# Patient Record
(demographics unavailable — no encounter records)

---

## 2025-01-28 NOTE — DISCUSSION/SUMMARY
[FreeTextEntry1] : 25 go  annual exam dw dyspareunia and   treatment options. lubrication, positional  empty  bladder  prior  to sex  clitoral;  orgasm  PAP today  [ ocp sent [facility]

## 2025-01-28 NOTE — HISTORY OF PRESENT ILLNESS
[Hair changes] : hair changes [Irregular Menstrual Interval] : irregular menstrual interval [Staining] : staining [Yes] : Patient has concerns regarding sex [Currently Active] : currently active [Men] : men [Vaginal] : vaginal [Oral] : oral [No] : No [Patient refuses STI testing] : Patient refuses STI testing [FreeTextEntry1] : Morelia Jay 25yo female presents here for an annual. Patient states she have an irregular period she spot or it will skips.   last pap 01/2024 normal   Patient is on birth control Aurovela Fe-120 1mg- 20 mcg (21)/75 MG (7) tablet  once daily   LMP: 01/06/2024  Sexually active with men  [TextBox_4] : 25y   Go  well woman - reports dyspareunia with  penetration   [TextBox_6] : 01/0625  [TextBox_11] : spotting cont ocp [FreeTextEntry3] : ocp

## 2025-01-28 NOTE — HISTORY OF PRESENT ILLNESS
[Hair changes] : hair changes [Irregular Menstrual Interval] : irregular menstrual interval [Staining] : staining [Yes] : Patient has concerns regarding sex [Currently Active] : currently active [Men] : men [Vaginal] : vaginal [Oral] : oral [No] : No [Patient refuses STI testing] : Patient refuses STI testing [FreeTextEntry1] : Morelia Jay 23yo female presents here for an annual. Patient states she have an irregular period she spot or it will skips.   last pap 01/2024 normal   Patient is on birth control Aurovela Fe-120 1mg- 20 mcg (21)/75 MG (7) tablet  once daily   LMP: 01/06/2024  Sexually active with men  [TextBox_4] : 25y   Go  well woman - reports dyspareunia with  penetration   [TextBox_6] : 01/0625  [TextBox_11] : spotting cont ocp [FreeTextEntry3] : ocp

## 2025-01-28 NOTE — PHYSICAL EXAM
[Chaperone Present] : A chaperone was present in the examining room during all aspects of the physical examination [[id number]] : A chaperone was present during the pelvic exam. [Appropriately responsive] : appropriately responsive [Alert] : alert [No Acute Distress] : no acute distress [No Lymphadenopathy] : no lymphadenopathy [Regular Rate Rhythm] : regular rate rhythm [No Murmurs] : no murmurs [Clear to Auscultation B/L] : clear to auscultation bilaterally [Soft] : soft [Non-tender] : non-tender [Non-distended] : non-distended [No HSM] : No HSM [No Lesions] : no lesions [No Mass] : no mass [Oriented x3] : oriented x3 [Examination Of The Breasts] : a normal appearance [No Masses] : no breast masses were palpable [Labia Majora] : normal [Labia Minora] : normal [Normal] : normal [Uterine Adnexae] : normal [FreeTextEntry2] : Etelvina

## 2025-01-28 NOTE — PHYSICAL EXAM
[Chaperone Present] : A chaperone was present in the examining room during all aspects of the physical examination [[id number]] : A chaperone was present during the pelvic exam. [Appropriately responsive] : appropriately responsive [Alert] : alert [No Acute Distress] : no acute distress [No Lymphadenopathy] : no lymphadenopathy [Regular Rate Rhythm] : regular rate rhythm [No Murmurs] : no murmurs [Clear to Auscultation B/L] : clear to auscultation bilaterally [Soft] : soft [Non-tender] : non-tender [Non-distended] : non-distended [No HSM] : No HSM [No Lesions] : no lesions [No Mass] : no mass [Oriented x3] : oriented x3 [Examination Of The Breasts] : a normal appearance [No Masses] : no breast masses were palpable [Labia Majora] : normal [Labia Minora] : normal [Normal] : normal [Uterine Adnexae] : normal [FreeTextEntry2] : Etelvina